# Patient Record
Sex: FEMALE | Race: WHITE | NOT HISPANIC OR LATINO | Employment: FULL TIME | URBAN - METROPOLITAN AREA
[De-identification: names, ages, dates, MRNs, and addresses within clinical notes are randomized per-mention and may not be internally consistent; named-entity substitution may affect disease eponyms.]

---

## 2024-03-07 ENCOUNTER — HOSPITAL ENCOUNTER (EMERGENCY)
Facility: CLINIC | Age: 49
Discharge: HOME | End: 2024-03-07
Attending: EMERGENCY MEDICINE | Admitting: EMERGENCY MEDICINE
Payer: COMMERCIAL

## 2024-03-07 VITALS
DIASTOLIC BLOOD PRESSURE: 73 MMHG | SYSTOLIC BLOOD PRESSURE: 133 MMHG | OXYGEN SATURATION: 98 % | HEART RATE: 68 BPM | RESPIRATION RATE: 14 BRPM | TEMPERATURE: 97.2 F

## 2024-03-07 DIAGNOSIS — R42 DIZZINESS: ICD-10-CM

## 2024-03-07 LAB
ANION GAP SERPL CALCULATED.3IONS-SCNC: 13 MMOL/L (ref 7–15)
ATRIAL RATE - MUSE: 58 BPM
BASOPHILS # BLD AUTO: 0.1 10E3/UL (ref 0–0.2)
BASOPHILS NFR BLD AUTO: 1 %
BUN SERPL-MCNC: 12.8 MG/DL (ref 6–20)
CALCIUM SERPL-MCNC: 9.4 MG/DL (ref 8.6–10)
CHLORIDE SERPL-SCNC: 105 MMOL/L (ref 98–107)
CREAT SERPL-MCNC: 0.92 MG/DL (ref 0.51–0.95)
DEPRECATED HCO3 PLAS-SCNC: 24 MMOL/L (ref 22–29)
DIASTOLIC BLOOD PRESSURE - MUSE: NORMAL MMHG
EGFRCR SERPLBLD CKD-EPI 2021: 76 ML/MIN/1.73M2
EOSINOPHIL # BLD AUTO: 0.1 10E3/UL (ref 0–0.7)
EOSINOPHIL NFR BLD AUTO: 2 %
ERYTHROCYTE [DISTWIDTH] IN BLOOD BY AUTOMATED COUNT: 12.8 % (ref 10–15)
GLUCOSE SERPL-MCNC: 91 MG/DL (ref 70–99)
HCT VFR BLD AUTO: 38.8 % (ref 35–47)
HGB BLD-MCNC: 12.6 G/DL (ref 11.7–15.7)
HOLD SPECIMEN: NORMAL
HOLD SPECIMEN: NORMAL
IMM GRANULOCYTES # BLD: 0 10E3/UL
IMM GRANULOCYTES NFR BLD: 0 %
INTERPRETATION ECG - MUSE: NORMAL
LYMPHOCYTES # BLD AUTO: 2.7 10E3/UL (ref 0.8–5.3)
LYMPHOCYTES NFR BLD AUTO: 39 %
MCH RBC QN AUTO: 30.7 PG (ref 26.5–33)
MCHC RBC AUTO-ENTMCNC: 32.5 G/DL (ref 31.5–36.5)
MCV RBC AUTO: 95 FL (ref 78–100)
MONOCYTES # BLD AUTO: 0.5 10E3/UL (ref 0–1.3)
MONOCYTES NFR BLD AUTO: 7 %
NEUTROPHILS # BLD AUTO: 3.6 10E3/UL (ref 1.6–8.3)
NEUTROPHILS NFR BLD AUTO: 51 %
NRBC # BLD AUTO: 0 10E3/UL
NRBC BLD AUTO-RTO: 0 /100
P AXIS - MUSE: 50 DEGREES
PLATELET # BLD AUTO: 332 10E3/UL (ref 150–450)
POTASSIUM SERPL-SCNC: 4.4 MMOL/L (ref 3.4–5.3)
PR INTERVAL - MUSE: 150 MS
QRS DURATION - MUSE: 72 MS
QT - MUSE: 432 MS
QTC - MUSE: 424 MS
R AXIS - MUSE: 45 DEGREES
RBC # BLD AUTO: 4.1 10E6/UL (ref 3.8–5.2)
SODIUM SERPL-SCNC: 142 MMOL/L (ref 135–145)
SYSTOLIC BLOOD PRESSURE - MUSE: NORMAL MMHG
T AXIS - MUSE: 61 DEGREES
VENTRICULAR RATE- MUSE: 58 BPM
WBC # BLD AUTO: 6.9 10E3/UL (ref 4–11)

## 2024-03-07 PROCEDURE — 85004 AUTOMATED DIFF WBC COUNT: CPT | Performed by: EMERGENCY MEDICINE

## 2024-03-07 PROCEDURE — 80048 BASIC METABOLIC PNL TOTAL CA: CPT | Performed by: EMERGENCY MEDICINE

## 2024-03-07 PROCEDURE — 99284 EMERGENCY DEPT VISIT MOD MDM: CPT

## 2024-03-07 PROCEDURE — 93005 ELECTROCARDIOGRAM TRACING: CPT

## 2024-03-07 PROCEDURE — 36415 COLL VENOUS BLD VENIPUNCTURE: CPT | Performed by: EMERGENCY MEDICINE

## 2024-03-07 RX ORDER — DIAZEPAM 5 MG
5 TABLET ORAL ONCE
Status: DISCONTINUED | OUTPATIENT
Start: 2024-03-07 | End: 2024-03-07 | Stop reason: HOSPADM

## 2024-03-07 ASSESSMENT — COLUMBIA-SUICIDE SEVERITY RATING SCALE - C-SSRS
6. HAVE YOU EVER DONE ANYTHING, STARTED TO DO ANYTHING, OR PREPARED TO DO ANYTHING TO END YOUR LIFE?: NO
1. IN THE PAST MONTH, HAVE YOU WISHED YOU WERE DEAD OR WISHED YOU COULD GO TO SLEEP AND NOT WAKE UP?: NO
2. HAVE YOU ACTUALLY HAD ANY THOUGHTS OF KILLING YOURSELF IN THE PAST MONTH?: NO

## 2024-03-07 ASSESSMENT — ACTIVITIES OF DAILY LIVING (ADL)
ADLS_ACUITY_SCORE: 35

## 2024-03-07 NOTE — ED TRIAGE NOTES
Pt c/o feeling dizzy starting yesterday with bilateral ear pain, seen at clinic earlier today

## 2024-03-08 NOTE — ED PROVIDER NOTES
History     Chief Complaint:  Dizziness       HPI   Deedrea SIMA García is a 48 year old female who presents to the ED  for evaluation of dizziness. She reports being balance yesterday and was having trouble standing up. She states she fell over yesterday but no injuries or loss of consciousness. Symptoms are still present after she woke up today. Denies numbness, weakness, tingling, speech problems, or vision changes. She also reports having neck pain and ear ringing. No history of vertigo or neck injuries.She drinks occasionally but no smoking. She states traveling once a week for work.     Independent Historian:    None - Patient only     Review of External Notes:  None      Medications:    Imitrex    Past Medical History:    Pneumonia    Physical Exam   Patient Vitals for the past 24 hrs:   BP Temp Temp src Pulse Resp SpO2   03/07/24 1749 133/73 97.2  F (36.2  C) Temporal 68 14 98 %      Physical Exam  General: Alert, interactive   Head:  Scalp is atraumatic  Eyes:  The pupils are equal, round, and reactive to light    EOM's intact, no nystagmus    No scleral icterus  ENT:      Nose:  The external nose is normal  Ears:  External ears are normal, mild middle ear effusion bilaterally  Mouth/Throat: Mucus membranes are moist       Neck:  Normal range of motion.      There is no rigidity.    Trachea is in the midline         CV:  Regular rate and rhythm    No murmur   Resp:  Breath sounds are clear bilaterally    Non-labored, no retractions or accessory muscle use      GI:  Abdomen is soft, no distension, no tenderness.       MS:  Normal strength in all 4 extremities  Skin:  Warm and dry, No rash or lesions noted.  Neuro:   Strength 5/5 x4.  Sensation intact  In all 4 extremities.     GCS: 15    NIH stroke scale score 0, patient ambulatory without incident in the emergency department  Psych: Awake. Alert.  Normal affect.      Appropriate interactions.    Emergency Department Course   ECG  ECG taken at 1811, ECG  read at 1826  Sinus bradycardia   Non specific T-wave abnormality  V1-V2   Rate 58 bpm. HI interval 150 ms. QRS duration 72 ms. QT/QTc 432/424 ms. P-R-T axes 50 45 61.    Laboratory:  Labs Ordered and Resulted from Time of ED Arrival to Time of ED Departure   BASIC METABOLIC PANEL - Normal       Result Value    Sodium 142      Potassium 4.4      Chloride 105      Carbon Dioxide (CO2) 24      Anion Gap 13      Urea Nitrogen 12.8      Creatinine 0.92      GFR Estimate 76      Calcium 9.4      Glucose 91     CBC WITH PLATELETS AND DIFFERENTIAL    WBC Count 6.9      RBC Count 4.10      Hemoglobin 12.6      Hematocrit 38.8      MCV 95      MCH 30.7      MCHC 32.5      RDW 12.8      Platelet Count 332      % Neutrophils 51      % Lymphocytes 39      % Monocytes 7      % Eosinophils 2      % Basophils 1      % Immature Granulocytes 0      NRBCs per 100 WBC 0      Absolute Neutrophils 3.6      Absolute Lymphocytes 2.7      Absolute Monocytes 0.5      Absolute Eosinophils 0.1      Absolute Basophils 0.1      Absolute Immature Granulocytes 0.0      Absolute NRBCs 0.0        Procedures   None    Emergency Department Course & Assessments:    Interventions:  Medications   diazepam (VALIUM) tablet 5 mg (has no administration in time range)      Independent Interpretation (X-rays, CTs, rhythm strip):  None    Assessments/Consultations/Discussion of Management or Tests:  ED Course as of 03/07/24 2100   Thu Mar 07, 2024   1810 I obtained history and examined the patient as noted above.    2100 The patient has eloped.      Social Determinants of Health affecting care:  None     Disposition:  The patient eloped from the emergency department.    Impression & Plan      Medical Decision Making:  Following presentation history and physical examination were performed in the emergency department lobby given ED crowding.  A robust workup including MRI of the brain and MR angiogram of the head and neck were ordered while the patient was  waiting.  Laboratory workup returned is unremarkable.  Unfortunately the patient eloped from the emergency department prior to completion of her advanced imaging.  I favor peripheral vertigo but cannot completely rule out posterior circulation stroke given the patient's elopement.    Diagnosis:    ICD-10-CM    1. Dizziness  R42            Discharge Medications:  There are no discharge medications for this patient.     Scribe Disclosure:  I, Kim Loving, am serving as a scribe at 6:14 PM on 3/7/2024 to document services personally performed by Roldan Mejia,* based on my observations and the provider's statements to me.  3/7/2024   Roldan Mejia,*      Roldan Mejia MD  03/08/24 0215